# Patient Record
Sex: MALE | Race: BLACK OR AFRICAN AMERICAN | NOT HISPANIC OR LATINO
[De-identification: names, ages, dates, MRNs, and addresses within clinical notes are randomized per-mention and may not be internally consistent; named-entity substitution may affect disease eponyms.]

---

## 2017-11-15 ENCOUNTER — APPOINTMENT (OUTPATIENT)
Dept: UROLOGY | Facility: CLINIC | Age: 47
End: 2017-11-15
Payer: COMMERCIAL

## 2017-11-15 VITALS
RESPIRATION RATE: 16 BRPM | BODY MASS INDEX: 41.2 KG/M2 | HEIGHT: 68.5 IN | OXYGEN SATURATION: 96 % | WEIGHT: 275 LBS | DIASTOLIC BLOOD PRESSURE: 76 MMHG | TEMPERATURE: 97.9 F | SYSTOLIC BLOOD PRESSURE: 118 MMHG | HEART RATE: 88 BPM

## 2017-11-15 DIAGNOSIS — N52.9 MALE ERECTILE DYSFUNCTION, UNSPECIFIED: ICD-10-CM

## 2017-11-15 PROCEDURE — 99203 OFFICE O/P NEW LOW 30 MIN: CPT

## 2017-11-21 PROBLEM — N52.9 ERECTILE DYSFUNCTION OF ORGANIC ORIGIN: Status: ACTIVE | Noted: 2017-11-21

## 2017-11-21 RX ORDER — METFORMIN HYDROCHLORIDE 1000 MG/1
1000 TABLET, COATED ORAL
Qty: 180 | Refills: 0 | Status: ACTIVE | COMMUNITY
Start: 2017-10-16

## 2017-11-21 RX ORDER — DAPAGLIFLOZIN 5 MG/1
5 TABLET, FILM COATED ORAL
Qty: 30 | Refills: 0 | Status: ACTIVE | COMMUNITY
Start: 2017-01-25

## 2017-11-21 RX ORDER — IBUPROFEN 400 MG/1
400 TABLET, FILM COATED ORAL
Qty: 30 | Refills: 0 | Status: ACTIVE | COMMUNITY
Start: 2017-05-05

## 2019-09-03 ENCOUNTER — APPOINTMENT (OUTPATIENT)
Dept: ORTHOPEDIC SURGERY | Facility: CLINIC | Age: 49
End: 2019-09-03
Payer: COMMERCIAL

## 2019-09-03 DIAGNOSIS — Z01.818 ENCOUNTER FOR OTHER PREPROCEDURAL EXAMINATION: ICD-10-CM

## 2019-09-03 PROCEDURE — 99204 OFFICE O/P NEW MOD 45 MIN: CPT

## 2019-09-04 PROBLEM — Z01.818 PREOP TESTING: Status: ACTIVE | Noted: 2019-09-04

## 2019-09-05 NOTE — END OF VISIT
[FreeTextEntry3] : All medical record entries made by Rigoberto Ovalles acting as a scribe for the performing provider (Ji Jorge MD and/or MARCOS Russell) on 09/03/2019. All entries were dictated to me by the performing medical provider. In signing this record, the medical provider affirms that they have personally performed the history, physical exam, assessment and plan and have also directed, reviewed and agreed to the documentation in the chart. [>50% of Time Spent on Counseling for ____] : Greater than 50% of the encounter time was spent on counseling for [unfilled] [Time Spent: ___ minutes] : I have spent [unfilled] minutes of face to face time with the patient

## 2019-09-05 NOTE — HISTORY OF PRESENT ILLNESS
[de-identified] : 48 year old M former smoker (quit 1 year ago) with DM presents today for initial evaluation of left hip pain that began about 10 years ago but has gotten progressively worse. He reports marked, daily left hip pain localized to the groin that occurs upon rising in the morning, with activity and after prolonged sitting. He reports slight, occasional right hip pain localized to the groin. He has difficulty placing shoes on either foot and reports that he actually needs assistance to place shoes on the left foot. Patient can walk greater than 10 blocks with a slight limp. He uses a rail to ascend and descend stairs. OTC analgesic medications help a little to relieve pain whereas physical therapy and bracing do not help.\par Of note, patient works as a  full time. Patient has DM and reports that his last hemoglobin A1C was in the 6-7 range. Mr. Godoy quit smoking about a year ago but reports that he occasionally uses a nicotine vaporizer.

## 2019-09-05 NOTE — DISCUSSION/SUMMARY
[de-identified] : Mr. PERKINS  has progressively severe hip pain and disability secondary to DJD and has failed extensive conservative care including activity modification, analgesic medications and therapeutic exercise. The patient was indicated for left total hip replacement.\par \par We discussed the details of the procedure, the expected recovery period, and the expected outcome. Bearing surface and fixation options were discussed, along with surgical approaches. For this patient I recommended a posterior vs. superior approach to ensure adequate surgical exposure.\par \par We discussed the likelihood of satisfaction after complete recovery, and the potential causes of dissatisfaction. Specific risks of total hip replacement were discussed in detail. We discussed the risk of surgical site complications including but not limited to: surgical site infection, wound healing complications, bone fracture, prosthetic hip dislocation, neurovascular injury, hemorrhage, limb length inequality, persistent pain and need for reoperation. We discussed surgical blood loss and the possible need for blood transfusion. We discussed the risk of perioperative medical complications, including but not limited to catheter-associated urinary tract infection, venous thromboembolism and other cardiopulmonary complications. We discussed anesthetic options and the risk of anesthesia-related complications. We discussed the durability of prosthetic hips and limitations related to wear, osteolysis and loosening. The patient was given a copy of my preoperative packet with additional information about the procedure.\par \par We discussed how nicotine use can affect wound healing and I strongly encouraged the patient to avoid all nicotine use prior to and immediately after the operation.\par \par The patient gave informed consent for the surgical procedure and was instructed to speak to my surgical coordinator to arrange the logistics of surgical scheduling, presurgical testing, and medical optimization and clearance.

## 2019-09-05 NOTE — PHYSICAL EXAM
[de-identified] : Constitutional: Overweight but well appearing. No acute distress.\par Mental Status: Alert & oriented to person, place and time. Normal affect.\par Pulmonary: No respiratory distress. Normal chest excursion.\par \par Gait: Left coxalgic.\par Ambulatory assist devices: None.\par \par Cervical spine: Skin intact. No visible deformity. Painless active ROM without evident restriction.\par Bilateral upper extremities: Skin intact. No deformity. Painless active ROM without evident restriction.\par Thoracolumbar spine: No deformity. No tenderness. No radicular pain on passive straight leg raise bilaterally.\par \par Pelvis: No pelvic obliquity. No tenderness.\par Leg lengths: Equal.\par \par Right Hip:\par Skin intact. No surgical scars. No erythema. No ecchymosis. No swelling. No deformity. No focal tenderness.\par ROM with mild discomfort from full extension to 95 degrees of flexion. 5 degrees of internal rotation. 45-50 degrees of external rotation. 50 degrees of abduction. 10 degrees of adduction.\par No crepitation. No instability. DE painless. Impingement (FADIR) painless. Stinchfield painless. Flexor power 5/5.\par \par Left Hip:\par Skin intact. No surgical scars. No erythema. No ecchymosis. No swelling. No deformity. No focal tenderness.\par Painful ROM from full extension to 70 degrees of flexion. 25 degrees of obligate external rotation. 35 degrees of external rotation. 10-15 degrees of abduction. 0 degrees of adduction.\par No crepitation. No instability.\par DE painful. Impingement (FADIR) painful. Stinchfield painful. \par Flexor power 4-/5, limited by pain.\par \par Bilateral Knees: Skin intact. No surgical scars. No erythema or ecchymosis. No swelling or effusion. No deformity. Painless and unrestricted range of motion. Central patellar tracking. No crepitation. No instability. \par \par Neurological: Intact distal crude touch sensation. Normal distal motor power.\par Cardiovascular: Palpable dorsalis pedis and posterior tibialis pulses. Brisk capillary refill. No peripheral edema.\par Lymphatics: No peripheral adenopathy appreciated. [de-identified] : X-ray imaging of the AP pelvis and left hip done here today demonstrates left hip with severe osteoarthritis with bone-on-bone joint space narrowing, subchondral sclerosis, osteophytes and subchondral cysts

## 2019-09-08 ENCOUNTER — FORM ENCOUNTER (OUTPATIENT)
Age: 49
End: 2019-09-08

## 2019-09-09 ENCOUNTER — APPOINTMENT (OUTPATIENT)
Dept: CT IMAGING | Facility: HOSPITAL | Age: 49
End: 2019-09-09

## 2019-09-09 ENCOUNTER — OUTPATIENT (OUTPATIENT)
Dept: OUTPATIENT SERVICES | Facility: HOSPITAL | Age: 49
LOS: 1 days | End: 2019-09-09
Payer: COMMERCIAL

## 2019-09-09 DIAGNOSIS — Z01.818 ENCOUNTER FOR OTHER PREPROCEDURAL EXAMINATION: ICD-10-CM

## 2019-09-09 DIAGNOSIS — Z22.321 CARRIER OR SUSPECTED CARRIER OF METHICILLIN SUSCEPTIBLE STAPHYLOCOCCUS AUREUS: ICD-10-CM

## 2019-09-09 LAB
ANION GAP SERPL CALC-SCNC: 10 MMOL/L — SIGNIFICANT CHANGE UP (ref 5–17)
APPEARANCE UR: CLEAR — SIGNIFICANT CHANGE UP
APTT BLD: 29.8 SEC — SIGNIFICANT CHANGE UP (ref 27.5–36.3)
BASOPHILS # BLD AUTO: 0.08 K/UL — SIGNIFICANT CHANGE UP (ref 0–0.2)
BASOPHILS NFR BLD AUTO: 1.2 % — SIGNIFICANT CHANGE UP (ref 0–2)
BILIRUB UR-MCNC: NEGATIVE — SIGNIFICANT CHANGE UP
BUN SERPL-MCNC: 12 MG/DL — SIGNIFICANT CHANGE UP (ref 7–23)
CALCIUM SERPL-MCNC: 9.6 MG/DL — SIGNIFICANT CHANGE UP (ref 8.4–10.5)
CHLORIDE SERPL-SCNC: 106 MMOL/L — SIGNIFICANT CHANGE UP (ref 96–108)
CO2 SERPL-SCNC: 25 MMOL/L — SIGNIFICANT CHANGE UP (ref 22–31)
COLOR SPEC: YELLOW — SIGNIFICANT CHANGE UP
CREAT SERPL-MCNC: 0.83 MG/DL — SIGNIFICANT CHANGE UP (ref 0.5–1.3)
DIFF PNL FLD: NEGATIVE — SIGNIFICANT CHANGE UP
EOSINOPHIL # BLD AUTO: 0.27 K/UL — SIGNIFICANT CHANGE UP (ref 0–0.5)
EOSINOPHIL NFR BLD AUTO: 3.9 % — SIGNIFICANT CHANGE UP (ref 0–6)
GLUCOSE SERPL-MCNC: 141 MG/DL — HIGH (ref 70–99)
GLUCOSE UR QL: 100
HBA1C BLD-MCNC: 8.8 % — HIGH (ref 4–5.6)
HCT VFR BLD CALC: 43.1 % — SIGNIFICANT CHANGE UP (ref 39–50)
HGB BLD-MCNC: 14.1 G/DL — SIGNIFICANT CHANGE UP (ref 13–17)
IMM GRANULOCYTES NFR BLD AUTO: 0.4 % — SIGNIFICANT CHANGE UP (ref 0–1.5)
INR BLD: 0.98 — SIGNIFICANT CHANGE UP (ref 0.88–1.16)
KETONES UR-MCNC: NEGATIVE — SIGNIFICANT CHANGE UP
LEUKOCYTE ESTERASE UR-ACNC: NEGATIVE — SIGNIFICANT CHANGE UP
LYMPHOCYTES # BLD AUTO: 2.54 K/UL — SIGNIFICANT CHANGE UP (ref 1–3.3)
LYMPHOCYTES # BLD AUTO: 36.8 % — SIGNIFICANT CHANGE UP (ref 13–44)
MCHC RBC-ENTMCNC: 30.1 PG — SIGNIFICANT CHANGE UP (ref 27–34)
MCHC RBC-ENTMCNC: 32.7 GM/DL — SIGNIFICANT CHANGE UP (ref 32–36)
MCV RBC AUTO: 92.1 FL — SIGNIFICANT CHANGE UP (ref 80–100)
MONOCYTES # BLD AUTO: 0.72 K/UL — SIGNIFICANT CHANGE UP (ref 0–0.9)
MONOCYTES NFR BLD AUTO: 10.4 % — SIGNIFICANT CHANGE UP (ref 2–14)
NEUTROPHILS # BLD AUTO: 3.26 K/UL — SIGNIFICANT CHANGE UP (ref 1.8–7.4)
NEUTROPHILS NFR BLD AUTO: 47.3 % — SIGNIFICANT CHANGE UP (ref 43–77)
NITRITE UR-MCNC: NEGATIVE — SIGNIFICANT CHANGE UP
NRBC # BLD: 0 /100 WBCS — SIGNIFICANT CHANGE UP (ref 0–0)
PH UR: 6.5 — SIGNIFICANT CHANGE UP (ref 5–8)
PLATELET # BLD AUTO: 210 K/UL — SIGNIFICANT CHANGE UP (ref 150–400)
POTASSIUM SERPL-MCNC: 4.4 MMOL/L — SIGNIFICANT CHANGE UP (ref 3.5–5.3)
POTASSIUM SERPL-SCNC: 4.4 MMOL/L — SIGNIFICANT CHANGE UP (ref 3.5–5.3)
PROT UR-MCNC: NEGATIVE MG/DL — SIGNIFICANT CHANGE UP
PROTHROM AB SERPL-ACNC: 11.1 SEC — SIGNIFICANT CHANGE UP (ref 10–12.9)
RBC # BLD: 4.68 M/UL — SIGNIFICANT CHANGE UP (ref 4.2–5.8)
RBC # FLD: 13.5 % — SIGNIFICANT CHANGE UP (ref 10.3–14.5)
SODIUM SERPL-SCNC: 141 MMOL/L — SIGNIFICANT CHANGE UP (ref 135–145)
SP GR SPEC: 1.02 — SIGNIFICANT CHANGE UP (ref 1–1.03)
UROBILINOGEN FLD QL: 0.2 E.U./DL — SIGNIFICANT CHANGE UP
WBC # BLD: 6.9 K/UL — SIGNIFICANT CHANGE UP (ref 3.8–10.5)
WBC # FLD AUTO: 6.9 K/UL — SIGNIFICANT CHANGE UP (ref 3.8–10.5)

## 2019-09-09 PROCEDURE — 93010 ELECTROCARDIOGRAM REPORT: CPT

## 2019-09-09 PROCEDURE — 93005 ELECTROCARDIOGRAM TRACING: CPT

## 2019-09-09 PROCEDURE — 85610 PROTHROMBIN TIME: CPT

## 2019-09-09 PROCEDURE — 80048 BASIC METABOLIC PNL TOTAL CA: CPT

## 2019-09-09 PROCEDURE — 81003 URINALYSIS AUTO W/O SCOPE: CPT

## 2019-09-09 PROCEDURE — 71046 X-RAY EXAM CHEST 2 VIEWS: CPT | Mod: 26

## 2019-09-09 PROCEDURE — 85730 THROMBOPLASTIN TIME PARTIAL: CPT

## 2019-09-09 PROCEDURE — 85025 COMPLETE CBC W/AUTO DIFF WBC: CPT

## 2019-09-09 PROCEDURE — 87086 URINE CULTURE/COLONY COUNT: CPT

## 2019-09-09 PROCEDURE — 83036 HEMOGLOBIN GLYCOSYLATED A1C: CPT

## 2019-09-09 PROCEDURE — 71046 X-RAY EXAM CHEST 2 VIEWS: CPT

## 2019-09-09 PROCEDURE — 87641 MR-STAPH DNA AMP PROBE: CPT

## 2019-09-10 LAB
CULTURE RESULTS: NO GROWTH — SIGNIFICANT CHANGE UP
MRSA PCR RESULT.: NEGATIVE — SIGNIFICANT CHANGE UP
S AUREUS DNA NOSE QL NAA+PROBE: NEGATIVE — SIGNIFICANT CHANGE UP
SPECIMEN SOURCE: SIGNIFICANT CHANGE UP

## 2019-09-12 LAB
ANABASINE UR-MCNC: SIGNIFICANT CHANGE UP NG/ML
COTININE UR-MCNC: >1200 NG/ML — HIGH
NICOTINE UR-MCNC: 90 NG/ML — HIGH
NORNICOTINE UR-MCNC: 26 NG/ML — HIGH

## 2019-09-20 ENCOUNTER — FORM ENCOUNTER (OUTPATIENT)
Age: 49
End: 2019-09-20

## 2019-09-21 ENCOUNTER — OUTPATIENT (OUTPATIENT)
Dept: OUTPATIENT SERVICES | Facility: HOSPITAL | Age: 49
LOS: 1 days | End: 2019-09-21
Payer: COMMERCIAL

## 2019-09-21 ENCOUNTER — APPOINTMENT (OUTPATIENT)
Dept: CT IMAGING | Facility: HOSPITAL | Age: 49
End: 2019-09-21
Payer: COMMERCIAL

## 2019-09-21 PROCEDURE — 73700 CT LOWER EXTREMITY W/O DYE: CPT

## 2019-09-21 PROCEDURE — 73700 CT LOWER EXTREMITY W/O DYE: CPT | Mod: 26,LT

## 2019-09-24 ENCOUNTER — APPOINTMENT (OUTPATIENT)
Dept: CT IMAGING | Facility: HOSPITAL | Age: 49
End: 2019-09-24

## 2019-09-30 ENCOUNTER — APPOINTMENT (OUTPATIENT)
Dept: ORTHOPEDIC SURGERY | Facility: HOSPITAL | Age: 49
End: 2019-09-30

## 2019-12-29 ENCOUNTER — FORM ENCOUNTER (OUTPATIENT)
Age: 49
End: 2019-12-29

## 2020-01-07 ENCOUNTER — FORM ENCOUNTER (OUTPATIENT)
Age: 50
End: 2020-01-07

## 2020-01-08 ENCOUNTER — APPOINTMENT (OUTPATIENT)
Dept: ORTHOPEDIC SURGERY | Facility: CLINIC | Age: 50
End: 2020-01-08
Payer: COMMERCIAL

## 2020-01-08 ENCOUNTER — OUTPATIENT (OUTPATIENT)
Dept: OUTPATIENT SERVICES | Facility: HOSPITAL | Age: 50
LOS: 1 days | End: 2020-01-08
Payer: COMMERCIAL

## 2020-01-08 DIAGNOSIS — M16.12 UNILATERAL PRIMARY OSTEOARTHRITIS, LEFT HIP: ICD-10-CM

## 2020-01-08 PROCEDURE — 99213 OFFICE O/P EST LOW 20 MIN: CPT

## 2020-01-08 PROCEDURE — 73521 X-RAY EXAM HIPS BI 2 VIEWS: CPT

## 2020-01-08 PROCEDURE — 73521 X-RAY EXAM HIPS BI 2 VIEWS: CPT | Mod: 26

## 2020-01-08 NOTE — REVIEW OF SYSTEMS
PT tx     07/10/19 1445   Pain Assessment   Pain Assessment 0-10   Pain Score 6   Pain Type   (headache)   Pain Location Hip   Pain Orientation Right  (thigh tightness)   Restrictions/Precautions   Weight Bearing Precautions Per Order Yes   RLE Weight Bearing Per Order WBAT   General   Chart Reviewed Yes   Cognition   Overall Cognitive Status WFL   Subjective   Subjective Just back to bed, agrees to ther ex   Exercises   THR Supine;10 reps;Right;AAROM   Assessment   Prognosis Good   Problem List Decreased strength;Decreased endurance; Impaired balance;Decreased mobility; Decreased coordination;Decreased safety awareness;Decreased skin integrity;Orthopedic restrictions   Assessment Pt able to perform ther ex with assist  SCDs reapplied afterward  Desires to remain in bed until headache subsides  Tent for transfer to rehab tomorrow  Discussed goals and expectations  Needs in reach   Goals   Patient Goals get better   Plan   Treatment/Interventions ADL retraining;Functional transfer training;LE strengthening/ROM; Therapeutic exercise; Endurance training;Patient/family training;Equipment eval/education; Bed mobility;Gait training;Spoke to nursing;Spoke to case management;OT   Progress Improving as expected   PT Frequency 5x/wk; Twice a day   Recommendation   Recommendation Short-term skilled PT   Equipment Recommended [facility] [Joint Pain] : joint pain [Negative] : Heme/Lymph

## 2020-01-10 ENCOUNTER — RX RENEWAL (OUTPATIENT)
Age: 50
End: 2020-01-10

## 2020-01-10 RX ORDER — MELOXICAM 7.5 MG/1
7.5 TABLET ORAL DAILY
Qty: 60 | Refills: 0 | Status: ACTIVE | COMMUNITY
Start: 2020-01-10 | End: 1900-01-01

## 2020-01-21 ENCOUNTER — OUTPATIENT (OUTPATIENT)
Dept: OUTPATIENT SERVICES | Facility: HOSPITAL | Age: 50
LOS: 1 days | End: 2020-01-21
Payer: COMMERCIAL

## 2020-01-21 DIAGNOSIS — Z22.321 CARRIER OR SUSPECTED CARRIER OF METHICILLIN SUSCEPTIBLE STAPHYLOCOCCUS AUREUS: ICD-10-CM

## 2020-01-21 LAB
MRSA PCR RESULT.: NEGATIVE — SIGNIFICANT CHANGE UP
S AUREUS DNA NOSE QL NAA+PROBE: NEGATIVE — SIGNIFICANT CHANGE UP

## 2020-01-21 PROCEDURE — 87641 MR-STAPH DNA AMP PROBE: CPT

## 2020-01-27 NOTE — DISCUSSION/SUMMARY
[de-identified] : - we discussed non operative and operative treatment options and the patient would like to proceed with L LAKESHA\par - continue NSAIDs and RICE for pain relief\par - will need pre operative clearance\par - follow up day of surgery\par \par MARANDA will benefit from the proposed procedure, he has failed a conservative treatment plan of supervised physical therapy, anti-inflammatory medications, and activity modification. He continues to have pain impacting his ability to perform ADL's and has a decreasing QoL.  We will schedule this for the earliest mutually convenient time after the appropriate pre-op laboratory tests and clearances are obtained.  All questions were answered and a thorough explanation of RBA were given.\par \par All medical record entries made by the PA/Scribe/Fellow are at my, Dr. Raymon Monae's direction and personally dictated by me on 01/08/2020]. I have reviewed the chart and agree that the record accurately reflects my personal performance of the history, physical exam, assessment, and plan. I have also personally directed reviewed, and agreed with the chart.\par

## 2020-01-27 NOTE — PHYSICAL EXAM
[Normal] : Normal bowel sounds, soft, non-tender, no hepato-splenomegaly and no abdominal mass palpated [de-identified] : NAD [de-identified] : LLE: No deformity. Non ttp. ROM: flex 60, ER 15, IR -5, abd 30. Strength 5/5. NVI distally. [de-identified] : XR of the left hip shows severe OA changes.

## 2020-02-13 ENCOUNTER — APPOINTMENT (OUTPATIENT)
Dept: ORTHOPEDIC SURGERY | Facility: HOSPITAL | Age: 50
End: 2020-02-13
Payer: COMMERCIAL

## 2020-02-13 ENCOUNTER — INPATIENT (INPATIENT)
Facility: HOSPITAL | Age: 50
LOS: 1 days | Discharge: HOME HEALTH | DRG: 470 | End: 2020-02-15
Attending: ORTHOPAEDIC SURGERY | Admitting: ORTHOPAEDIC SURGERY
Payer: COMMERCIAL

## 2020-02-13 VITALS
RESPIRATION RATE: 16 BRPM | OXYGEN SATURATION: 98 % | DIASTOLIC BLOOD PRESSURE: 73 MMHG | TEMPERATURE: 98 F | HEART RATE: 61 BPM | WEIGHT: 273.59 LBS | SYSTOLIC BLOOD PRESSURE: 123 MMHG | HEIGHT: 69 IN

## 2020-02-13 LAB
BASOPHILS # BLD AUTO: 0.07 K/UL — SIGNIFICANT CHANGE UP (ref 0–0.2)
BASOPHILS NFR BLD AUTO: 0.6 % — SIGNIFICANT CHANGE UP (ref 0–2)
EOSINOPHIL # BLD AUTO: 0.19 K/UL — SIGNIFICANT CHANGE UP (ref 0–0.5)
EOSINOPHIL NFR BLD AUTO: 1.6 % — SIGNIFICANT CHANGE UP (ref 0–6)
GLUCOSE BLDC GLUCOMTR-MCNC: 108 MG/DL — HIGH (ref 70–99)
GLUCOSE BLDC GLUCOMTR-MCNC: 167 MG/DL — HIGH (ref 70–99)
GLUCOSE BLDC GLUCOMTR-MCNC: 87 MG/DL — SIGNIFICANT CHANGE UP (ref 70–99)
GLUCOSE BLDC GLUCOMTR-MCNC: 88 MG/DL — SIGNIFICANT CHANGE UP (ref 70–99)
HCT VFR BLD CALC: 43 % — SIGNIFICANT CHANGE UP (ref 39–50)
HGB BLD-MCNC: 14 G/DL — SIGNIFICANT CHANGE UP (ref 13–17)
IMM GRANULOCYTES NFR BLD AUTO: 0.9 % — SIGNIFICANT CHANGE UP (ref 0–1.5)
LYMPHOCYTES # BLD AUTO: 1.76 K/UL — SIGNIFICANT CHANGE UP (ref 1–3.3)
LYMPHOCYTES # BLD AUTO: 14.9 % — SIGNIFICANT CHANGE UP (ref 13–44)
MCHC RBC-ENTMCNC: 30.2 PG — SIGNIFICANT CHANGE UP (ref 27–34)
MCHC RBC-ENTMCNC: 32.6 GM/DL — SIGNIFICANT CHANGE UP (ref 32–36)
MCV RBC AUTO: 92.9 FL — SIGNIFICANT CHANGE UP (ref 80–100)
MONOCYTES # BLD AUTO: 0.49 K/UL — SIGNIFICANT CHANGE UP (ref 0–0.9)
MONOCYTES NFR BLD AUTO: 4.1 % — SIGNIFICANT CHANGE UP (ref 2–14)
NEUTROPHILS # BLD AUTO: 9.23 K/UL — HIGH (ref 1.8–7.4)
NEUTROPHILS NFR BLD AUTO: 77.9 % — HIGH (ref 43–77)
NRBC # BLD: 0 /100 WBCS — SIGNIFICANT CHANGE UP (ref 0–0)
PLATELET # BLD AUTO: 206 K/UL — SIGNIFICANT CHANGE UP (ref 150–400)
RBC # BLD: 4.63 M/UL — SIGNIFICANT CHANGE UP (ref 4.2–5.8)
RBC # FLD: 13.7 % — SIGNIFICANT CHANGE UP (ref 10.3–14.5)
WBC # BLD: 11.85 K/UL — HIGH (ref 3.8–10.5)
WBC # FLD AUTO: 11.85 K/UL — HIGH (ref 3.8–10.5)

## 2020-02-13 PROCEDURE — 27130 TOTAL HIP ARTHROPLASTY: CPT | Mod: LT

## 2020-02-13 PROCEDURE — 27130 TOTAL HIP ARTHROPLASTY: CPT | Mod: AS,LT

## 2020-02-13 PROCEDURE — 73501 X-RAY EXAM HIP UNI 1 VIEW: CPT | Mod: 26,LT

## 2020-02-13 RX ORDER — CEFAZOLIN SODIUM 1 G
2000 VIAL (EA) INJECTION EVERY 8 HOURS
Refills: 0 | Status: DISCONTINUED | OUTPATIENT
Start: 2020-02-13 | End: 2020-02-13

## 2020-02-13 RX ORDER — CELECOXIB 200 MG/1
200 CAPSULE ORAL
Refills: 0 | Status: DISCONTINUED | OUTPATIENT
Start: 2020-02-13 | End: 2020-02-15

## 2020-02-13 RX ORDER — EMPAGLIFLOZIN 10 MG/1
1 TABLET, FILM COATED ORAL
Qty: 0 | Refills: 0 | DISCHARGE

## 2020-02-13 RX ORDER — PANTOPRAZOLE SODIUM 20 MG/1
40 TABLET, DELAYED RELEASE ORAL
Refills: 0 | Status: DISCONTINUED | OUTPATIENT
Start: 2020-02-13 | End: 2020-02-15

## 2020-02-13 RX ORDER — ASPIRIN/CALCIUM CARB/MAGNESIUM 324 MG
81 TABLET ORAL
Refills: 0 | Status: DISCONTINUED | OUTPATIENT
Start: 2020-02-13 | End: 2020-02-15

## 2020-02-13 RX ORDER — OXYCODONE HYDROCHLORIDE 5 MG/1
10 TABLET ORAL EVERY 4 HOURS
Refills: 0 | Status: DISCONTINUED | OUTPATIENT
Start: 2020-02-13 | End: 2020-02-15

## 2020-02-13 RX ORDER — ACETAMINOPHEN 500 MG
650 TABLET ORAL EVERY 6 HOURS
Refills: 0 | Status: DISCONTINUED | OUTPATIENT
Start: 2020-02-13 | End: 2020-02-15

## 2020-02-13 RX ORDER — METFORMIN HYDROCHLORIDE 850 MG/1
1000 TABLET ORAL
Refills: 0 | Status: DISCONTINUED | OUTPATIENT
Start: 2020-02-13 | End: 2020-02-13

## 2020-02-13 RX ORDER — METFORMIN HYDROCHLORIDE 850 MG/1
1 TABLET ORAL
Qty: 0 | Refills: 0 | DISCHARGE

## 2020-02-13 RX ORDER — POLYETHYLENE GLYCOL 3350 17 G/17G
17 POWDER, FOR SOLUTION ORAL DAILY
Refills: 0 | Status: DISCONTINUED | OUTPATIENT
Start: 2020-02-13 | End: 2020-02-15

## 2020-02-13 RX ORDER — ATORVASTATIN CALCIUM 80 MG/1
10 TABLET, FILM COATED ORAL AT BEDTIME
Refills: 0 | Status: DISCONTINUED | OUTPATIENT
Start: 2020-02-13 | End: 2020-02-15

## 2020-02-13 RX ORDER — CEFAZOLIN SODIUM 1 G
3000 VIAL (EA) INJECTION EVERY 8 HOURS
Refills: 0 | Status: COMPLETED | OUTPATIENT
Start: 2020-02-13 | End: 2020-02-14

## 2020-02-13 RX ORDER — DEXTROSE 50 % IN WATER 50 %
25 SYRINGE (ML) INTRAVENOUS ONCE
Refills: 0 | Status: DISCONTINUED | OUTPATIENT
Start: 2020-02-13 | End: 2020-02-15

## 2020-02-13 RX ORDER — OXYCODONE HYDROCHLORIDE 5 MG/1
5 TABLET ORAL EVERY 4 HOURS
Refills: 0 | Status: DISCONTINUED | OUTPATIENT
Start: 2020-02-13 | End: 2020-02-15

## 2020-02-13 RX ORDER — INSULIN LISPRO 100/ML
VIAL (ML) SUBCUTANEOUS
Refills: 0 | Status: DISCONTINUED | OUTPATIENT
Start: 2020-02-13 | End: 2020-02-15

## 2020-02-13 RX ORDER — SODIUM CHLORIDE 9 MG/ML
1000 INJECTION, SOLUTION INTRAVENOUS
Refills: 0 | Status: DISCONTINUED | OUTPATIENT
Start: 2020-02-13 | End: 2020-02-15

## 2020-02-13 RX ORDER — GLUCAGON INJECTION, SOLUTION 0.5 MG/.1ML
1 INJECTION, SOLUTION SUBCUTANEOUS ONCE
Refills: 0 | Status: DISCONTINUED | OUTPATIENT
Start: 2020-02-13 | End: 2020-02-15

## 2020-02-13 RX ORDER — DEXTROSE 50 % IN WATER 50 %
15 SYRINGE (ML) INTRAVENOUS ONCE
Refills: 0 | Status: DISCONTINUED | OUTPATIENT
Start: 2020-02-13 | End: 2020-02-15

## 2020-02-13 RX ORDER — HYDROMORPHONE HYDROCHLORIDE 2 MG/ML
0.5 INJECTION INTRAMUSCULAR; INTRAVENOUS; SUBCUTANEOUS
Refills: 0 | Status: DISCONTINUED | OUTPATIENT
Start: 2020-02-13 | End: 2020-02-15

## 2020-02-13 RX ORDER — KETOROLAC TROMETHAMINE 30 MG/ML
15 SYRINGE (ML) INJECTION ONCE
Refills: 0 | Status: DISCONTINUED | OUTPATIENT
Start: 2020-02-13 | End: 2020-02-13

## 2020-02-13 RX ORDER — DEXTROSE 50 % IN WATER 50 %
12.5 SYRINGE (ML) INTRAVENOUS ONCE
Refills: 0 | Status: DISCONTINUED | OUTPATIENT
Start: 2020-02-13 | End: 2020-02-15

## 2020-02-13 RX ORDER — MAGNESIUM HYDROXIDE 400 MG/1
30 TABLET, CHEWABLE ORAL DAILY
Refills: 0 | Status: DISCONTINUED | OUTPATIENT
Start: 2020-02-13 | End: 2020-02-15

## 2020-02-13 RX ORDER — ONDANSETRON 8 MG/1
4 TABLET, FILM COATED ORAL EVERY 6 HOURS
Refills: 0 | Status: DISCONTINUED | OUTPATIENT
Start: 2020-02-13 | End: 2020-02-15

## 2020-02-13 RX ORDER — FAMOTIDINE 10 MG/ML
20 INJECTION INTRAVENOUS EVERY 12 HOURS
Refills: 0 | Status: DISCONTINUED | OUTPATIENT
Start: 2020-02-13 | End: 2020-02-15

## 2020-02-13 RX ORDER — SENNA PLUS 8.6 MG/1
2 TABLET ORAL AT BEDTIME
Refills: 0 | Status: DISCONTINUED | OUTPATIENT
Start: 2020-02-13 | End: 2020-02-15

## 2020-02-13 RX ORDER — DIPHENHYDRAMINE HCL 50 MG
25 CAPSULE ORAL AT BEDTIME
Refills: 0 | Status: DISCONTINUED | OUTPATIENT
Start: 2020-02-13 | End: 2020-02-15

## 2020-02-13 RX ADMIN — HYDROMORPHONE HYDROCHLORIDE 0.5 MILLIGRAM(S): 2 INJECTION INTRAMUSCULAR; INTRAVENOUS; SUBCUTANEOUS at 16:43

## 2020-02-13 RX ADMIN — Medication 650 MILLIGRAM(S): at 23:22

## 2020-02-13 RX ADMIN — OXYCODONE HYDROCHLORIDE 10 MILLIGRAM(S): 5 TABLET ORAL at 23:22

## 2020-02-13 RX ADMIN — Medication 650 MILLIGRAM(S): at 20:35

## 2020-02-13 RX ADMIN — ATORVASTATIN CALCIUM 10 MILLIGRAM(S): 80 TABLET, FILM COATED ORAL at 21:19

## 2020-02-13 RX ADMIN — Medication 650 MILLIGRAM(S): at 19:35

## 2020-02-13 RX ADMIN — Medication 81 MILLIGRAM(S): at 19:36

## 2020-02-13 RX ADMIN — Medication 200 MILLIGRAM(S): at 19:36

## 2020-02-13 RX ADMIN — OXYCODONE HYDROCHLORIDE 5 MILLIGRAM(S): 5 TABLET ORAL at 20:35

## 2020-02-13 RX ADMIN — SODIUM CHLORIDE 120 MILLILITER(S): 9 INJECTION, SOLUTION INTRAVENOUS at 12:52

## 2020-02-13 RX ADMIN — HYDROMORPHONE HYDROCHLORIDE 0.5 MILLIGRAM(S): 2 INJECTION INTRAMUSCULAR; INTRAVENOUS; SUBCUTANEOUS at 16:28

## 2020-02-13 RX ADMIN — OXYCODONE HYDROCHLORIDE 5 MILLIGRAM(S): 5 TABLET ORAL at 19:35

## 2020-02-13 RX ADMIN — Medication 2: at 22:30

## 2020-02-13 RX ADMIN — FAMOTIDINE 20 MILLIGRAM(S): 10 INJECTION INTRAVENOUS at 19:36

## 2020-02-13 RX ADMIN — Medication 15 MILLIGRAM(S): at 16:30

## 2020-02-13 NOTE — PHYSICAL THERAPY INITIAL EVALUATION ADULT - PLANNED THERAPY INTERVENTIONS, PT EVAL
gait training/balance training/bed mobility training/ROM/stair negotiation/strengthening/transfer training

## 2020-02-13 NOTE — H&P ADULT - NSICDXPASTMEDICALHX_GEN_ALL_CORE_FT
PAST MEDICAL HISTORY:  Dyslipidemia     Morbid obesity     Osteoarthritis of left hip     Type 2 diabetes mellitus

## 2020-02-13 NOTE — BRIEF OPERATIVE NOTE - ESTIMATED BLOOD LOSS
I had called him twice early earlier today.  I called the number provided and this led to a voicemail system which is not yet set up.  I called back on the original number and told him to call back with a number that I can reach him at.   100

## 2020-02-13 NOTE — PHYSICAL THERAPY INITIAL EVALUATION ADULT - PERTINENT HX OF CURRENT PROBLEM, REHAB EVAL
48yo male co atraumatic nonradiating L hip pain >10 years.  Pt states pain began slowly and progressed denying any precipitating event.Pt takes ibuprofen for his left hip pain without relief. Pt ambulates indepencently. Pt has attempted and failed conservative treatment for his left hip pain consisting of oral pain medication.  Patient presents for elective L LAKESHA.

## 2020-02-13 NOTE — PHYSICAL THERAPY INITIAL EVALUATION ADULT - ADDITIONAL COMMENTS
Pt reports living at home with his wife and daughter. He has 3 flights of stairs to access his home (21 steps total). He denied any prior use of AD.

## 2020-02-13 NOTE — H&P ADULT - NSHPPHYSICALEXAM_GEN_ALL_CORE
General: Alert and oriented, NAD  MSK:  Decreased ROM of left hip secondary to pain.  EHL/FHL/TA/Gastro 5/5 b/l   DP's palpable b/l le   Gross sensation to light touch intact throughout lower extremities b/l and symmetrically   Calves, soft, compressible, NTTP  Remainder of PE as per medical clearance

## 2020-02-13 NOTE — H&P ADULT - HISTORY OF PRESENT ILLNESS
48yo male co atraumatic nonradiating L hip pain >10 years.  Pt states pain began slowly and progressed denying any precipitating event.  Pt takes ibuprofen for his left hip pain without relief. Pt ambulates indepencently. Pt has attempted and failed conservative treatment for his left hip pain consisting of oral pain medication.  Pt not on any anticoagulation meds and denies hx of DVT. Pt denies numbness and tingling down lower extremities b/l, fever, chills, recent illness, CP, SOB, N/V, or any other complaints.   Patient presents for elective L LAKESHA.

## 2020-02-13 NOTE — H&P ADULT - NSICDXFAMILYHX_GEN_ALL_CORE_FT
FAMILY HISTORY:  Grandparent  Still living? No  Family history of diabetes mellitus in grandmother, Age at diagnosis: Age Unknown  Family history of hypertension in grandmother, Age at diagnosis: Age Unknown

## 2020-02-13 NOTE — PROGRESS NOTE ADULT - SUBJECTIVE AND OBJECTIVE BOX
Ortho Post Op Check    Procedure: L LAKESHA  Surgeon: Dov    Pt comfortable without complaints, pain controlled. Denies CP, SOB, N/V, numbness/tingling     Vital Signs Last 24 Hrs  T(C): 36.6 (02-13-20 @ 20:27), Max: 36.6 (02-13-20 @ 15:38)  T(F): 97.9 (02-13-20 @ 20:27), Max: 97.9 (02-13-20 @ 20:27)  HR: 79 (02-13-20 @ 20:27) (54 - 79)  BP: 126/69 (02-13-20 @ 20:27) (119/68 - 134/61)  BP(mean): 93 (02-13-20 @ 16:38) (89 - 93)  RR: 16 (02-13-20 @ 20:27) (12 - 16)  SpO2: 97% (02-13-20 @ 20:27) (97% - 100%)    Physical Exam:  General: Resting comfortably in bed. AAOx3. NAD.  Extremities:        LLE: Dressing intact, dry SILT L2-S1 distribution, symmetric. TA/EHL/FHL/GS motor intact. WWP, DP 2+       RLE: No gross deformity. SILT L2-S1 distribution, symmetric. TA/EHL/FHL/GS motor intact. WWP, DP 2+                            14.0   11.85 )---------( 206      ( 13 Feb 2020 13:23 )             43.0           Post-op X-Ray: Well-fixed L hip prosthesis    A/P: 49yMale POD#0 s/p L LAKESHA  - Stable  - Pain Control  - DVT ppx: ASA  - Post op abx: Ancef  - PT, WBS: PWB    Ortho Pager [phone]

## 2020-02-14 ENCOUNTER — TRANSCRIPTION ENCOUNTER (OUTPATIENT)
Age: 50
End: 2020-02-14

## 2020-02-14 LAB
ANION GAP SERPL CALC-SCNC: 8 MMOL/L — SIGNIFICANT CHANGE UP (ref 5–17)
BUN SERPL-MCNC: 14 MG/DL — SIGNIFICANT CHANGE UP (ref 7–23)
CALCIUM SERPL-MCNC: 8.2 MG/DL — LOW (ref 8.4–10.5)
CHLORIDE SERPL-SCNC: 104 MMOL/L — SIGNIFICANT CHANGE UP (ref 96–108)
CO2 SERPL-SCNC: 21 MMOL/L — LOW (ref 22–31)
CREAT SERPL-MCNC: 0.74 MG/DL — SIGNIFICANT CHANGE UP (ref 0.5–1.3)
GLUCOSE BLDC GLUCOMTR-MCNC: 150 MG/DL — HIGH (ref 70–99)
GLUCOSE BLDC GLUCOMTR-MCNC: 171 MG/DL — HIGH (ref 70–99)
GLUCOSE BLDC GLUCOMTR-MCNC: 210 MG/DL — HIGH (ref 70–99)
GLUCOSE SERPL-MCNC: 192 MG/DL — HIGH (ref 70–99)
HBA1C BLD-MCNC: 6.7 % — HIGH (ref 4–5.6)
HCT VFR BLD CALC: 40.2 % — SIGNIFICANT CHANGE UP (ref 39–50)
HGB BLD-MCNC: 13.4 G/DL — SIGNIFICANT CHANGE UP (ref 13–17)
MCHC RBC-ENTMCNC: 30.2 PG — SIGNIFICANT CHANGE UP (ref 27–34)
MCHC RBC-ENTMCNC: 33.3 GM/DL — SIGNIFICANT CHANGE UP (ref 32–36)
MCV RBC AUTO: 90.5 FL — SIGNIFICANT CHANGE UP (ref 80–100)
NRBC # BLD: 0 /100 WBCS — SIGNIFICANT CHANGE UP (ref 0–0)
PLATELET # BLD AUTO: 203 K/UL — SIGNIFICANT CHANGE UP (ref 150–400)
POTASSIUM SERPL-MCNC: 3.7 MMOL/L — SIGNIFICANT CHANGE UP (ref 3.5–5.3)
POTASSIUM SERPL-SCNC: 3.7 MMOL/L — SIGNIFICANT CHANGE UP (ref 3.5–5.3)
RBC # BLD: 4.44 M/UL — SIGNIFICANT CHANGE UP (ref 4.2–5.8)
RBC # FLD: 13.5 % — SIGNIFICANT CHANGE UP (ref 10.3–14.5)
SODIUM SERPL-SCNC: 133 MMOL/L — LOW (ref 135–145)
WBC # BLD: 14.66 K/UL — HIGH (ref 3.8–10.5)
WBC # FLD AUTO: 14.66 K/UL — HIGH (ref 3.8–10.5)

## 2020-02-14 PROCEDURE — 99222 1ST HOSP IP/OBS MODERATE 55: CPT

## 2020-02-14 RX ADMIN — OXYCODONE HYDROCHLORIDE 10 MILLIGRAM(S): 5 TABLET ORAL at 00:22

## 2020-02-14 RX ADMIN — Medication 650 MILLIGRAM(S): at 23:18

## 2020-02-14 RX ADMIN — OXYCODONE HYDROCHLORIDE 10 MILLIGRAM(S): 5 TABLET ORAL at 18:38

## 2020-02-14 RX ADMIN — OXYCODONE HYDROCHLORIDE 10 MILLIGRAM(S): 5 TABLET ORAL at 13:10

## 2020-02-14 RX ADMIN — FAMOTIDINE 20 MILLIGRAM(S): 10 INJECTION INTRAVENOUS at 05:58

## 2020-02-14 RX ADMIN — FAMOTIDINE 20 MILLIGRAM(S): 10 INJECTION INTRAVENOUS at 17:38

## 2020-02-14 RX ADMIN — OXYCODONE HYDROCHLORIDE 10 MILLIGRAM(S): 5 TABLET ORAL at 21:35

## 2020-02-14 RX ADMIN — Medication 4: at 21:53

## 2020-02-14 RX ADMIN — Medication 81 MILLIGRAM(S): at 05:58

## 2020-02-14 RX ADMIN — Medication 200 MILLIGRAM(S): at 01:46

## 2020-02-14 RX ADMIN — OXYCODONE HYDROCHLORIDE 10 MILLIGRAM(S): 5 TABLET ORAL at 09:31

## 2020-02-14 RX ADMIN — OXYCODONE HYDROCHLORIDE 10 MILLIGRAM(S): 5 TABLET ORAL at 14:10

## 2020-02-14 RX ADMIN — ONDANSETRON 4 MILLIGRAM(S): 8 TABLET, FILM COATED ORAL at 07:16

## 2020-02-14 RX ADMIN — OXYCODONE HYDROCHLORIDE 10 MILLIGRAM(S): 5 TABLET ORAL at 22:35

## 2020-02-14 RX ADMIN — Medication 650 MILLIGRAM(S): at 17:38

## 2020-02-14 RX ADMIN — Medication 650 MILLIGRAM(S): at 14:11

## 2020-02-14 RX ADMIN — Medication 650 MILLIGRAM(S): at 00:22

## 2020-02-14 RX ADMIN — OXYCODONE HYDROCHLORIDE 10 MILLIGRAM(S): 5 TABLET ORAL at 04:39

## 2020-02-14 RX ADMIN — OXYCODONE HYDROCHLORIDE 10 MILLIGRAM(S): 5 TABLET ORAL at 08:31

## 2020-02-14 RX ADMIN — OXYCODONE HYDROCHLORIDE 10 MILLIGRAM(S): 5 TABLET ORAL at 03:39

## 2020-02-14 RX ADMIN — PANTOPRAZOLE SODIUM 40 MILLIGRAM(S): 20 TABLET, DELAYED RELEASE ORAL at 05:58

## 2020-02-14 RX ADMIN — Medication 81 MILLIGRAM(S): at 17:38

## 2020-02-14 RX ADMIN — OXYCODONE HYDROCHLORIDE 10 MILLIGRAM(S): 5 TABLET ORAL at 17:38

## 2020-02-14 RX ADMIN — Medication 650 MILLIGRAM(S): at 05:58

## 2020-02-14 RX ADMIN — Medication 650 MILLIGRAM(S): at 06:38

## 2020-02-14 RX ADMIN — Medication 650 MILLIGRAM(S): at 18:38

## 2020-02-14 RX ADMIN — POLYETHYLENE GLYCOL 3350 17 GRAM(S): 17 POWDER, FOR SOLUTION ORAL at 13:11

## 2020-02-14 RX ADMIN — ATORVASTATIN CALCIUM 10 MILLIGRAM(S): 80 TABLET, FILM COATED ORAL at 21:35

## 2020-02-14 RX ADMIN — Medication 2: at 07:04

## 2020-02-14 RX ADMIN — Medication 650 MILLIGRAM(S): at 13:11

## 2020-02-14 NOTE — PROGRESS NOTE ADULT - SUBJECTIVE AND OBJECTIVE BOX
POST OPERATIVE DAY #: 0   STATUS POST: s/p left total hip replacement             SUBJECTIVE: Patient seen and examined. States to doing well. Mild pain controlled with medication. Patient denies any CP, SOB, fever, chills, numbness/tingling.     Pain:  well controlled      OBJECTIVE:     Vital Signs Last 24 Hrs  T(C): 36.8 (14 Feb 2020 08:29), Max: 36.8 (14 Feb 2020 08:29)  T(F): 98.3 (14 Feb 2020 08:29), Max: 98.3 (14 Feb 2020 08:29)  HR: 59 (14 Feb 2020 08:29) (48 - 79)  BP: 105/61 (14 Feb 2020 08:29) (102/61 - 134/61)  BP(mean): 93 (13 Feb 2020 16:38) (81 - 93)  RR: 17 (14 Feb 2020 08:29) (11 - 17)  SpO2: 97% (14 Feb 2020 08:29) (95% - 100%)    Affected extremity:          Dressing: clean/dry/intact          Sensation: intact to light touch          Motor exam: 5/5 to EHL/TA/GS         warm, well-perfused; capillary refill < 3 seconds              I&O's Detail    13 Feb 2020 07:01  -  14 Feb 2020 07:00  --------------------------------------------------------  IN:    lactated ringers.: 360 mL  Total IN: 360 mL    OUT:    Voided: 1000 mL  Total OUT: 1000 mL    Total NET: -640 mL      14 Feb 2020 07:01  -  14 Feb 2020 11:00  --------------------------------------------------------  IN:    Oral Fluid: 240 mL  Total IN: 240 mL    OUT:  Total OUT: 0 mL    Total NET: 240 mL          LABS:                        13.4   14.66 )-----------( 203      ( 14 Feb 2020 06:54 )             40.2     02-14    133<L>  |  104  |  14  ----------------------------<  192<H>  3.7   |  21<L>  |  0.74    Ca    8.2<L>      14 Feb 2020 06:54            MEDICATIONS:    acetaminophen   Tablet .. 650 milliGRAM(s) Oral every 6 hours  celecoxib 200 milliGRAM(s) Oral two times a day  diphenhydrAMINE 25 milliGRAM(s) Oral at bedtime PRN  HYDROmorphone  Injectable 0.5 milliGRAM(s) IV Push every 15 minutes PRN  ondansetron Injectable 4 milliGRAM(s) IV Push every 6 hours PRN  oxyCODONE    IR 5 milliGRAM(s) Oral every 4 hours PRN  oxyCODONE    IR 10 milliGRAM(s) Oral every 4 hours PRN    aspirin enteric coated 81 milliGRAM(s) Oral two times a day      RADIOLOGY & ADDITIONAL STUDIES:    ASSESSMENT AND PLAN: s/p left total hip replacement    1. Analgesic pain control  2. DVT prophylaxis: ASA         SCDs       3. Continue PT   4. Weight Bearing Status:  Weight bearing as tolerated        5. Disposition: Home with home PT

## 2020-02-14 NOTE — DISCHARGE NOTE PROVIDER - NSDCMRMEDTOKEN_GEN_ALL_CORE_FT
atorvastatin 10 mg oral tablet: 1 tab(s) orally once a day (at bedtime)  Jardiance 10 mg oral tablet: 1 tab(s) orally once a day (in the morning)  metFORMIN 1000 mg oral tablet: 1 tab(s) orally 2 times a day acetaminophen 325 mg oral tablet: 2 tab(s) orally every 6 hours, As Needed for mild pain / fevers  Aspirin Enteric Coated 81 mg oral delayed release tablet: 1 tab(s) orally 2 times a day   atorvastatin 10 mg oral tablet: 1 tab(s) orally once a day (at bedtime)  celecoxib 200 mg oral capsule: 1 cap(s) orally 2 times a day  docusate sodium 100 mg oral tablet: 1 tab(s) orally 2 times a day, As Needed for constipation  Jardiance 10 mg oral tablet: 1 tab(s) orally once a day (in the morning)  metFORMIN 1000 mg oral tablet: 1 tab(s) orally 2 times a day  oxyCODONE 5 mg oral tablet: 1-2 tab(s) orally every 4 hours, As Needed for moderate to severe pain MDD:6   senna oral tablet: 2 tab(s) orally once a day (at bedtime), As needed, Constipation

## 2020-02-14 NOTE — DISCHARGE NOTE PROVIDER - NSDCFUADDINST_GEN_ALL_CORE_FT
Weight bear as tolerated with assistive device.  No strenuous activity, heavy lifting, driving or returning to work until cleared by MD.  You may shower - dressing is water-resistant, no soaking in bathtubs.  Keep dressing intact until your followup appointment with Dr. Monae.  Try to have regular bowel movements, take stool softener or laxative if necessary.  May take Pepcid or Zantac for upset stomach.  May take Aleve or Naproxen instead of Meloxicam/Celebrex.  Swelling may travel all the way down leg to foot, this is normal and will subside in a few weeks.  Call to schedule an appt with Dr. Monae for follow up, if you have staples or sutures they will be removed in office.  Contact your doctor if you experience: fever greater than 101.5, chills, chest pain, difficulty breathing, redness or excessive drainage around the incision, other concerns.  Follow up with your primary care provider. 50% protected weight on operated hip using a rolling walker  Anterior and posterior hip precautions to prevent hip dislocation  No crossing your legs. Do not bend past your waist.  Use long-handled reach to pick things up if purchased.  Sit in a high chair.  If you do not have a high chair, use cushions on the chair  No strenuous activity, heavy lifting, driving or returning to work until cleared by MD.  You may shower - dressing is water-resistant, no soaking in bathtubs.  Keep dressing on your hip until the follow up appointment.  Try to have regular bowel movements, take stool softener or laxative if necessary.  May take Pepcid or Zantac for upset stomach.  Swelling may travel all the way down leg to foot, this is normal and will subside in a few weeks.  Call to schedule an appt with Dr. Monae for follow up.    Contact your doctor if you experience: fever greater than 101.5, chills, chest pain, difficulty breathing, redness or excessive drainage around the incision, other concerns.  Follow up with your primary care provider. Protected weightbearing on operated hip using a rolling walker  Anterior hip precautions to prevent hip dislocation  No crossing your legs. Do not bend past your waist.  Use long-handled reach to pick things up if purchased.  Sit in a high chair.  If you do not have a high chair, use cushions on the chair  No strenuous activity, heavy lifting, driving or returning to work until cleared by MD.  You may shower - dressing is water-resistant, no soaking in bathtubs.  Keep dressing on your hip until the follow up appointment.  Try to have regular bowel movements, take stool softener or laxative if necessary.  May take Pepcid or Zantac for upset stomach.  Swelling may travel all the way down leg to foot, this is normal and will subside in a few weeks.  Call to schedule an appt with Dr. Monae for follow up.    Contact your doctor if you experience: fever greater than 101.5, chills, chest pain, difficulty breathing, redness or excessive drainage around the incision, other concerns.  Follow up with your primary care provider.

## 2020-02-14 NOTE — CONSULT NOTE ADULT - ASSESSMENT
49M with DM (a1c 6.7), HLD, OA here for L LAKESHA 2/13 now POD1    #Post-op state - pain controlled at this time. c/w bowel regimen, c/w IS. Dispo per PT and pending primary team. ppx w ASA 81 BID  #OA - Chronic, post-op  #DM - Chronic, stable. BGs at target. Holding home regimen. c/w SSI  #HLD - Chronic, Stable. c/w home regimen  #Hyponatremia - Acute, likely low PO intake on day of surgery. Appears euvolemic.     Recommendations   - dispo pending PT eval   - can keep on SSI at this time since needs are low; will continue following sugars   - BMP in AM, encourage PO, if lower obtain urine Sodium, Cr, and OSM

## 2020-02-14 NOTE — DISCHARGE NOTE PROVIDER - HOSPITAL COURSE
Admitted    Surgery    Kristy-op Antibiotics    Pain control    DVT prophylaxis    OOB/Physical Therapy Admitted    Surgery - left total hip replacement    Kristy-op Antibiotics    Pain control    DVT prophylaxis    OOB/Physical Therapy

## 2020-02-14 NOTE — DISCHARGE NOTE PROVIDER - NSDCACTIVITY_GEN_ALL_CORE
Do not make important decisions/Do not drive or operate machinery/No heavy lifting/straining Do not make important decisions/No heavy lifting/straining/Walking - Indoors allowed/Do not drive or operate machinery

## 2020-02-14 NOTE — DISCHARGE NOTE PROVIDER - CARE PROVIDER_API CALL
Raymon Monae)  Orthopaedic Surgery  [address]  [place], [place] [address]  Phone: [phone]  Fax: [phone]  Follow Up Time: Raymon Monae)  Orthopaedic Surgery  [address]  [place], [place] [address]  Phone: [phone]  Fax: [phone]  Follow Up Time: 2 weeks

## 2020-02-14 NOTE — PROGRESS NOTE ADULT - SUBJECTIVE AND OBJECTIVE BOX
S: Patient seen and examined. Doing well this AM. Pain reported but controlled. No acute events overnight.    O:   Vital Signs Last 24 Hrs  T(C): 36.7 (14 Feb 2020 00:42), Max: 36.7 (13 Feb 2020 09:02)  T(F): 98.1 (14 Feb 2020 00:42), Max: 98.1 (14 Feb 2020 00:42)  HR: 67 (14 Feb 2020 00:42) (48 - 79)  BP: 124/69 (14 Feb 2020 00:42) (110/71 - 134/61)  BP(mean): 93 (13 Feb 2020 16:38) (81 - 93)  RR: 16 (14 Feb 2020 00:42) (11 - 16)  SpO2: 95% (14 Feb 2020 00:42) (95% - 100%)    Motor: intact GS/TA/EHL/FHL BL   SILT to patients baseline BL  WWP DP PT BL  Dressing C/D/I

## 2020-02-14 NOTE — CONSULT NOTE ADULT - SUBJECTIVE AND OBJECTIVE BOX
LATE ENTRY - pt seen approx 11:30am  49M with DM (a1c 6.7), HLD, OA here for L LAKESHA 2/13 now POD1    Regarding DM - takes metformin, jardiance at home. a1C 6.7. On Atorvastatin for HLD.   Currently states pain is controlled. Denies any numbness or weakness. No chest pain, dyspnea, nausea, abd pain. Urinating wo issue. No BM    FHx: HTN in grandmother  SH: former smoker - quit 1mo ago, EtOH last drink was months ago    PAST MEDICAL & SURGICAL HISTORY:  Osteoarthritis of left hip  Type 2 diabetes mellitus  Morbid obesity  Dyslipidemia  Lipoma of back: removed    Home Meds: Home Medications:  atorvastatin 10 mg oral tablet: 1 tab(s) orally once a day (at bedtime) (13 Feb 2020 08:58)  Jardiance 10 mg oral tablet: 1 tab(s) orally once a day (in the morning) (13 Feb 2020 08:58)  metFORMIN 1000 mg oral tablet: 1 tab(s) orally 2 times a day (13 Feb 2020 08:58)    Allergies: Allergies    No Known Allergies    Intolerances      Soc:   Advanced Directives: Presumed Full Code     CURRENT MEDICATIONS:   --------------------------------------------------------------------------------------  Neurologic Medications  acetaminophen   Tablet .. 650 milliGRAM(s) Oral every 6 hours  celecoxib 200 milliGRAM(s) Oral two times a day  diphenhydrAMINE 25 milliGRAM(s) Oral at bedtime PRN Insomnia  HYDROmorphone  Injectable 0.5 milliGRAM(s) IV Push every 15 minutes PRN Severe Pain (7 - 10)  ondansetron Injectable 4 milliGRAM(s) IV Push every 6 hours PRN Nausea and/or Vomiting  oxyCODONE    IR 5 milliGRAM(s) Oral every 4 hours PRN Mild Pain (1 - 3)  oxyCODONE    IR 10 milliGRAM(s) Oral every 4 hours PRN Moderate Pain (4 - 6)    Respiratory Medications    Cardiovascular Medications    Gastrointestinal Medications  aluminum hydroxide/magnesium hydroxide/simethicone Suspension 30 milliLiter(s) Oral four times a day PRN Indigestion  dextrose 5%. 1000 milliLiter(s) IV Continuous <Continuous>  famotidine    Tablet 20 milliGRAM(s) Oral every 12 hours  lactated ringers. 1000 milliLiter(s) IV Continuous <Continuous>  magnesium hydroxide Suspension 30 milliLiter(s) Oral daily PRN Constipation  pantoprazole    Tablet 40 milliGRAM(s) Oral before breakfast  polyethylene glycol 3350 17 Gram(s) Oral daily  senna 2 Tablet(s) Oral at bedtime PRN Constipation    Genitourinary Medications    Hematologic/Oncologic Medications  aspirin enteric coated 81 milliGRAM(s) Oral two times a day    Antimicrobial/Immunologic Medications    Endocrine/Metabolic Medications  atorvastatin 10 milliGRAM(s) Oral at bedtime  dextrose 40% Gel 15 Gram(s) Oral once PRN Blood Glucose LESS THAN 70 milliGRAM(s)/deciliter  dextrose 50% Injectable 12.5 Gram(s) IV Push once  dextrose 50% Injectable 25 Gram(s) IV Push once  dextrose 50% Injectable 25 Gram(s) IV Push once  glucagon  Injectable 1 milliGRAM(s) IntraMuscular once PRN Glucose LESS THAN 70 milligrams/deciliter  insulin lispro (HumaLOG) corrective regimen sliding scale   SubCutaneous Before meals and at bedtime    Topical/Other Medications    --------------------------------------------------------------------------------------    VITAL SIGNS, INS/OUTS (last 24 hours):  --------------------------------------------------------------------------------------  ICU Vital Signs Last 24 Hrs  T(C): 36.4 (14 Feb 2020 20:20), Max: 36.8 (14 Feb 2020 08:29)  T(F): 97.5 (14 Feb 2020 20:20), Max: 98.3 (14 Feb 2020 08:29)  HR: 80 (14 Feb 2020 20:20) (59 - 80)  BP: 134/67 (14 Feb 2020 20:20) (102/61 - 134/67)  BP(mean): --  ABP: --  ABP(mean): --  RR: 16 (14 Feb 2020 20:20) (16 - 17)  SpO2: 95% (14 Feb 2020 20:20) (95% - 97%)    I&O's Summary    13 Feb 2020 07:01  -  14 Feb 2020 07:00  --------------------------------------------------------  IN: 360 mL / OUT: 1000 mL / NET: -640 mL    14 Feb 2020 07:01  -  14 Feb 2020 20:21  --------------------------------------------------------  IN: 480 mL / OUT: 400 mL / NET: 80 mL      --------------------------------------------------------------------------------------    EXAM:  GEN: Male in bed in NAD  HEENT: NC/AT, MMM  NECK: Supple  CV: RRR, nml s1s2, no murmurs, warm ext w SCDs, no tenderness  PULM: Nml effort, CTAB, no rales, wheezing, ronchi  ABD: soft, non-tender, nABS  NEURO: Alert, conversant  CN II-XII grossly intact  Sensory intact b/l  Motor: LLE 3/5, BUE 5/5, RLE 5/5  Cerebellar: gait deferred    LABS  --------------------------------------------------------------------------------------  Labs:  CAPILLARY BLOOD GLUCOSE      POCT Blood Glucose.: 150 mg/dL (14 Feb 2020 16:37)  POCT Blood Glucose.: 171 mg/dL (14 Feb 2020 06:46)  POCT Blood Glucose.: 167 mg/dL (13 Feb 2020 21:49)                          13.4   14.66 )-----------( 203      ( 14 Feb 2020 06:54 )             40.2         02-14    133<L>  |  104  |  14  ----------------------------<  192<H>  3.7   |  21<L>  |  0.74      Calcium, Total Serum: 8.2 mg/dL (02-14-20 @ 06:54)      LFTs:         Coags:                  --------------------------------------------------------------------------------------    OTHER LABS    IMAGING RESULTS  ****************

## 2020-02-15 ENCOUNTER — TRANSCRIPTION ENCOUNTER (OUTPATIENT)
Age: 50
End: 2020-02-15

## 2020-02-15 VITALS — OXYGEN SATURATION: 97 % | HEART RATE: 74 BPM | DIASTOLIC BLOOD PRESSURE: 73 MMHG | SYSTOLIC BLOOD PRESSURE: 127 MMHG

## 2020-02-15 LAB
ANION GAP SERPL CALC-SCNC: 7 MMOL/L — SIGNIFICANT CHANGE UP (ref 5–17)
BUN SERPL-MCNC: 12 MG/DL — SIGNIFICANT CHANGE UP (ref 7–23)
CALCIUM SERPL-MCNC: 8 MG/DL — LOW (ref 8.4–10.5)
CHLORIDE SERPL-SCNC: 103 MMOL/L — SIGNIFICANT CHANGE UP (ref 96–108)
CO2 SERPL-SCNC: 23 MMOL/L — SIGNIFICANT CHANGE UP (ref 22–31)
CREAT SERPL-MCNC: 0.71 MG/DL — SIGNIFICANT CHANGE UP (ref 0.5–1.3)
GLUCOSE BLDC GLUCOMTR-MCNC: 125 MG/DL — HIGH (ref 70–99)
GLUCOSE BLDC GLUCOMTR-MCNC: 139 MG/DL — HIGH (ref 70–99)
GLUCOSE SERPL-MCNC: 151 MG/DL — HIGH (ref 70–99)
HCT VFR BLD CALC: 38.8 % — LOW (ref 39–50)
HGB BLD-MCNC: 12.5 G/DL — LOW (ref 13–17)
MCHC RBC-ENTMCNC: 30.3 PG — SIGNIFICANT CHANGE UP (ref 27–34)
MCHC RBC-ENTMCNC: 32.2 GM/DL — SIGNIFICANT CHANGE UP (ref 32–36)
MCV RBC AUTO: 94.2 FL — SIGNIFICANT CHANGE UP (ref 80–100)
NRBC # BLD: 0 /100 WBCS — SIGNIFICANT CHANGE UP (ref 0–0)
PLATELET # BLD AUTO: 177 K/UL — SIGNIFICANT CHANGE UP (ref 150–400)
POTASSIUM SERPL-MCNC: 3.8 MMOL/L — SIGNIFICANT CHANGE UP (ref 3.5–5.3)
POTASSIUM SERPL-SCNC: 3.8 MMOL/L — SIGNIFICANT CHANGE UP (ref 3.5–5.3)
RBC # BLD: 4.12 M/UL — LOW (ref 4.2–5.8)
RBC # FLD: 13.7 % — SIGNIFICANT CHANGE UP (ref 10.3–14.5)
SODIUM SERPL-SCNC: 133 MMOL/L — LOW (ref 135–145)
WBC # BLD: 10.9 K/UL — HIGH (ref 3.8–10.5)
WBC # FLD AUTO: 10.9 K/UL — HIGH (ref 3.8–10.5)

## 2020-02-15 PROCEDURE — 99233 SBSQ HOSP IP/OBS HIGH 50: CPT

## 2020-02-15 RX ORDER — ASPIRIN/CALCIUM CARB/MAGNESIUM 324 MG
1 TABLET ORAL
Qty: 60 | Refills: 0
Start: 2020-02-15 | End: 2020-03-15

## 2020-02-15 RX ORDER — CELECOXIB 200 MG/1
1 CAPSULE ORAL
Qty: 28 | Refills: 0
Start: 2020-02-15 | End: 2020-02-28

## 2020-02-15 RX ORDER — ACETAMINOPHEN 500 MG
2 TABLET ORAL
Qty: 0 | Refills: 0 | DISCHARGE
Start: 2020-02-15

## 2020-02-15 RX ORDER — OXYCODONE HYDROCHLORIDE 5 MG/1
1 TABLET ORAL
Qty: 42 | Refills: 0
Start: 2020-02-15 | End: 2020-02-21

## 2020-02-15 RX ORDER — DOCUSATE SODIUM 100 MG
1 CAPSULE ORAL
Qty: 0 | Refills: 0 | DISCHARGE

## 2020-02-15 RX ORDER — SENNA PLUS 8.6 MG/1
2 TABLET ORAL
Qty: 0 | Refills: 0 | DISCHARGE
Start: 2020-02-15

## 2020-02-15 RX ADMIN — PANTOPRAZOLE SODIUM 40 MILLIGRAM(S): 20 TABLET, DELAYED RELEASE ORAL at 05:14

## 2020-02-15 RX ADMIN — Medication 81 MILLIGRAM(S): at 05:14

## 2020-02-15 RX ADMIN — MAGNESIUM HYDROXIDE 30 MILLILITER(S): 400 TABLET, CHEWABLE ORAL at 06:53

## 2020-02-15 RX ADMIN — Medication 650 MILLIGRAM(S): at 06:14

## 2020-02-15 RX ADMIN — POLYETHYLENE GLYCOL 3350 17 GRAM(S): 17 POWDER, FOR SOLUTION ORAL at 11:47

## 2020-02-15 RX ADMIN — Medication 15 MILLIGRAM(S): at 07:56

## 2020-02-15 RX ADMIN — FAMOTIDINE 20 MILLIGRAM(S): 10 INJECTION INTRAVENOUS at 05:14

## 2020-02-15 RX ADMIN — Medication 650 MILLIGRAM(S): at 12:30

## 2020-02-15 RX ADMIN — Medication 650 MILLIGRAM(S): at 11:47

## 2020-02-15 RX ADMIN — Medication 650 MILLIGRAM(S): at 05:14

## 2020-02-15 RX ADMIN — CELECOXIB 200 MILLIGRAM(S): 200 CAPSULE ORAL at 06:14

## 2020-02-15 RX ADMIN — CELECOXIB 200 MILLIGRAM(S): 200 CAPSULE ORAL at 05:14

## 2020-02-15 RX ADMIN — Medication 650 MILLIGRAM(S): at 00:18

## 2020-02-15 NOTE — DISCHARGE NOTE NURSING/CASE MANAGEMENT/SOCIAL WORK - PATIENT PORTAL LINK FT
You can access the FollowMyHealth Patient Portal offered by [facility] by registering at the following website: [email or web address]. By joining [facility]’s FollowMyHealth portal, you will also be able to view your health information using other applications (apps) compatible with our system.

## 2020-02-15 NOTE — PROGRESS NOTE ADULT - SUBJECTIVE AND OBJECTIVE BOX
O/N Events: JACI  Subjective/ROS: Denies HA, CP, SOB, n/v, changes in bowel/urinary habits.  12pt ROS otherwise negative.    VITALS  Vital Signs Last 24 Hrs  T(C): 37.1 (15 Feb 2020 08:47), Max: 37.1 (15 Feb 2020 08:47)  T(F): 98.7 (15 Feb 2020 08:47), Max: 98.7 (15 Feb 2020 08:47)  HR: 74 (15 Feb 2020 11:14) (72 - 83)  BP: 127/73 (15 Feb 2020 11:14) (126/68 - 134/67)  BP(mean): --  RR: 16 (15 Feb 2020 08:47) (16 - 16)  SpO2: 97% (15 Feb 2020 11:14) (95% - 97%)    CAPILLARY BLOOD GLUCOSE      POCT Blood Glucose.: 125 mg/dL (15 Feb 2020 12:33)  POCT Blood Glucose.: 139 mg/dL (15 Feb 2020 06:48)  POCT Blood Glucose.: 210 mg/dL (14 Feb 2020 21:47)  POCT Blood Glucose.: 150 mg/dL (14 Feb 2020 16:37)      PHYSICAL EXAM  General: A&Ox3; NAD; walking w/ walker in hallway  Head: NC/AT; MMM; PERRL; EOMI;  Neck: Supple; no JVD  Respiratory: CTA B/L; no wheezes/crackles   Cardiovascular: Regular rhythm/rate; S1/S2   Gastrointestinal: Soft; NTND; normoactive BS  Extremities: WWP; no edema/cyanosis  Neurological:  CNII-XII grossly intact; no obvious focal deficits    MEDICATIONS  (STANDING):  acetaminophen   Tablet .. 650 milliGRAM(s) Oral every 6 hours  aspirin enteric coated 81 milliGRAM(s) Oral two times a day  atorvastatin 10 milliGRAM(s) Oral at bedtime  celecoxib 200 milliGRAM(s) Oral two times a day  dextrose 5%. 1000 milliLiter(s) (50 mL/Hr) IV Continuous <Continuous>  dextrose 50% Injectable 12.5 Gram(s) IV Push once  dextrose 50% Injectable 25 Gram(s) IV Push once  dextrose 50% Injectable 25 Gram(s) IV Push once  famotidine    Tablet 20 milliGRAM(s) Oral every 12 hours  insulin lispro (HumaLOG) corrective regimen sliding scale   SubCutaneous Before meals and at bedtime  lactated ringers. 1000 milliLiter(s) (120 mL/Hr) IV Continuous <Continuous>  pantoprazole    Tablet 40 milliGRAM(s) Oral before breakfast  polyethylene glycol 3350 17 Gram(s) Oral daily    MEDICATIONS  (PRN):  aluminum hydroxide/magnesium hydroxide/simethicone Suspension 30 milliLiter(s) Oral four times a day PRN Indigestion  bisacodyl Suppository 10 milliGRAM(s) Rectal daily PRN If no bowel movement by POD#2  dextrose 40% Gel 15 Gram(s) Oral once PRN Blood Glucose LESS THAN 70 milliGRAM(s)/deciliter  diphenhydrAMINE 25 milliGRAM(s) Oral at bedtime PRN Insomnia  glucagon  Injectable 1 milliGRAM(s) IntraMuscular once PRN Glucose LESS THAN 70 milligrams/deciliter  HYDROmorphone  Injectable 0.5 milliGRAM(s) IV Push every 15 minutes PRN Severe Pain (7 - 10)  magnesium hydroxide Suspension 30 milliLiter(s) Oral daily PRN Constipation  ondansetron Injectable 4 milliGRAM(s) IV Push every 6 hours PRN Nausea and/or Vomiting  oxyCODONE    IR 5 milliGRAM(s) Oral every 4 hours PRN Mild Pain (1 - 3)  oxyCODONE    IR 10 milliGRAM(s) Oral every 4 hours PRN Moderate Pain (4 - 6)  senna 2 Tablet(s) Oral at bedtime PRN Constipation      No Known Allergies      LABS                        12.5   10.90 )-----------( 177      ( 15 Feb 2020 07:29 )             38.8     02-15    133<L>  |  103  |  12  ----------------------------<  151<H>  3.8   |  23  |  0.71    Ca    8.0<L>      15 Feb 2020 07:29    IMAGING/EKG/ETC: reviewed

## 2020-02-15 NOTE — PROGRESS NOTE ADULT - ASSESSMENT
49M w/ DM (a1c 6.7), HLD, OA here for L LAKESHA 2/13 now POD2    #Post-op state - pain controlled at this time. c/w bowel regimen, c/w IS.  #OA - Stable  #DM - stable. FSG at target. c/w SSI  #HLD - c/w home regimen    #Hyponatremia - 133 today, appears euvolemic. asymptomatic, c/w monitoring while inpatient    DVT PPx - C/w asa

## 2020-02-15 NOTE — PROGRESS NOTE ADULT - SUBJECTIVE AND OBJECTIVE BOX
S: No events overnight    O:   Vital Signs Last 24 Hrs  T(C): 37.1 (15 Feb 2020 08:47), Max: 37.1 (15 Feb 2020 08:47)  T(F): 98.7 (15 Feb 2020 08:47), Max: 98.7 (15 Feb 2020 08:47)  HR: 72 (15 Feb 2020 08:47) (66 - 83)  BP: 130/60 (15 Feb 2020 08:47) (126/68 - 134/67)  BP(mean): --  RR: 16 (15 Feb 2020 08:47) (16 - 16)  SpO2: 97% (15 Feb 2020 08:47) (95% - 97%)    02-14 @ 07:01  -  02-15 @ 07:00  --------------------------------------------------------  IN:    Oral Fluid: 480 mL  Total IN: 480 mL    OUT:    Voided: 400 mL  Total OUT: 400 mL    Total NET: 80 mL    PE:  LLE DSG CDI  Sensation to light touch intact S/S/DP/SP/Tib, Strength EHL/FHL/TA/GS 5/5, DP 2+, Ext WWP                        12.5   10.90 )-----------( 177      ( 15 Feb 2020 07:29 )             38.8     02-15    133<L>  |  103  |  12  ----------------------------<  151<H>  3.8   |  23  |  0.71    Ca    8.0<L>      15 Feb 2020 07:29

## 2020-02-15 NOTE — PROGRESS NOTE ADULT - ASSESSMENT
49y m s/p L LAKESHA 2/13 POD2    -DM, med recs   -PT PWB50%  -Pain control  -DVT PPX: ASA  -Dispo Planning: Home, PT

## 2020-02-20 DIAGNOSIS — E66.01 MORBID (SEVERE) OBESITY DUE TO EXCESS CALORIES: ICD-10-CM

## 2020-02-20 DIAGNOSIS — E11.9 TYPE 2 DIABETES MELLITUS WITHOUT COMPLICATIONS: ICD-10-CM

## 2020-02-20 DIAGNOSIS — M25.552 PAIN IN LEFT HIP: ICD-10-CM

## 2020-02-20 DIAGNOSIS — E87.1 HYPO-OSMOLALITY AND HYPONATREMIA: ICD-10-CM

## 2020-02-20 DIAGNOSIS — M16.12 UNILATERAL PRIMARY OSTEOARTHRITIS, LEFT HIP: ICD-10-CM

## 2020-02-20 DIAGNOSIS — E78.5 HYPERLIPIDEMIA, UNSPECIFIED: ICD-10-CM

## 2020-02-20 DIAGNOSIS — Z79.84 LONG TERM (CURRENT) USE OF ORAL HYPOGLYCEMIC DRUGS: ICD-10-CM

## 2020-02-20 PROCEDURE — 86901 BLOOD TYPING SEROLOGIC RH(D): CPT

## 2020-02-20 PROCEDURE — 36415 COLL VENOUS BLD VENIPUNCTURE: CPT

## 2020-02-20 PROCEDURE — 86850 RBC ANTIBODY SCREEN: CPT

## 2020-02-20 PROCEDURE — 97116 GAIT TRAINING THERAPY: CPT

## 2020-02-20 PROCEDURE — 85027 COMPLETE CBC AUTOMATED: CPT

## 2020-02-20 PROCEDURE — 80048 BASIC METABOLIC PNL TOTAL CA: CPT

## 2020-02-20 PROCEDURE — 82962 GLUCOSE BLOOD TEST: CPT

## 2020-02-20 PROCEDURE — C1776: CPT

## 2020-02-20 PROCEDURE — 73501 X-RAY EXAM HIP UNI 1 VIEW: CPT

## 2020-02-20 PROCEDURE — 85025 COMPLETE CBC W/AUTO DIFF WBC: CPT

## 2020-02-20 PROCEDURE — 83036 HEMOGLOBIN GLYCOSYLATED A1C: CPT

## 2020-02-20 PROCEDURE — 97161 PT EVAL LOW COMPLEX 20 MIN: CPT

## 2020-02-28 ENCOUNTER — APPOINTMENT (OUTPATIENT)
Dept: ORTHOPEDIC SURGERY | Facility: CLINIC | Age: 50
End: 2020-02-28
Payer: COMMERCIAL

## 2020-02-28 DIAGNOSIS — Z47.1 AFTERCARE FOLLOWING JOINT REPLACEMENT SURGERY: ICD-10-CM

## 2020-02-28 DIAGNOSIS — Z96.642 AFTERCARE FOLLOWING JOINT REPLACEMENT SURGERY: ICD-10-CM

## 2020-02-28 PROCEDURE — 99024 POSTOP FOLLOW-UP VISIT: CPT

## 2020-02-28 RX ORDER — CELECOXIB 100 MG/1
100 CAPSULE ORAL TWICE DAILY
Qty: 60 | Refills: 0 | Status: ACTIVE | COMMUNITY
Start: 2020-02-28 | End: 1900-01-01

## 2020-02-28 RX ORDER — ASPIRIN 81 MG/1
81 TABLET, CHEWABLE ORAL DAILY
Qty: 30 | Refills: 0 | Status: ACTIVE | COMMUNITY
Start: 2020-02-28 | End: 1900-01-01

## 2020-03-23 NOTE — HISTORY OF PRESENT ILLNESS
[de-identified] : Mukul is a 49 year old male s/p left THR on 02/13/20 here for 1st post operative visit. Patient is overall doing great and offers no significant complaints. He is able to ambulate and do most daily living activities. He takes Celebrex, Aspiring, or Oxycodone if needed for pain management.

## 2020-03-23 NOTE — END OF VISIT
[FreeTextEntry3] : By signing my name below, I, Catie Hernadez, attest that this documentation has been prepared under the direction and in the presence of Mike Aguayo PA-C.\par

## 2020-03-23 NOTE — PHYSICAL EXAM
[de-identified] : General: Not in acute distress, dressed appropriately, sitting on examination table\par Skin: Warm and dry, normal turgor, no rashes\par Neurological: AOx3, Cranial nerves grossly in tact\par Psych: Mood and affect appropriate\par \par Left Hip: Well healed surgical incision. No swelling edema erythema redness or drainage. Nontender.. ROM: Not assessed. 5/5 strength. Normal gait, ambulates with a cane. \par  \par  [de-identified] : No imaging today.\par

## 2020-03-23 NOTE — ASSESSMENT
[FreeTextEntry1] : Assessment/Plan\par \par Status post left total hip, incision is healing well. He will continue weightbearing and hip precautions until 5 weeks, weightbearing as tolerated after that and restrictions lifted, begin physical therapy at 5 weeks - prescription given, \par follow up in 10 week anniversary with x-rays, all questions answered.\par \par F/U in 10 weeks for x-rays \par \par

## 2020-04-06 ENCOUNTER — APPOINTMENT (OUTPATIENT)
Dept: ORTHOPEDIC SURGERY | Facility: CLINIC | Age: 50
End: 2020-04-06

## 2020-05-13 ENCOUNTER — FORM ENCOUNTER (OUTPATIENT)
Age: 50
End: 2020-05-13

## 2022-07-20 NOTE — PHYSICAL THERAPY INITIAL EVALUATION ADULT - ASR WT BEARING STATUS EVAL
Problem: Patient Centered Care  Goal: Patient preferences are identified and integrated in the patient's plan of care  Description: Interventions:  - What would you like us to know as we care for you? 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  - Provide timely, complete, and accurate information to patient/family  - Incorporate patient and family knowledge, values, beliefs, and cultural backgrounds into the planning and delivery of care  - Encourage patient/family to participate in care and decision-making at the level they choose  - Honor patient and family perspectives and choices  Outcome: Progressing     Problem: Patient/Family Goals  Goal: Patient/Family Long Term Goal  Description: Patient's Long Term Goal: Feel better    Interventions:  - IV abx  - Replace electrolytes  - IVF  - UA  - GI stool panel  - Strict intake and ouput  - See additional Care Plan goals for specific interventions  Outcome: Progressing  Goal: Patient/Family Short Term Goal  Description: Patient's Short Term Goal: No pain    Interventions:   - Pain meds prn  - US kidney  - CT pelvis  - Monitor vitals  - Reposition  - See additional Care Plan goals for specific interventions  Outcome: Progressing     Problem: CARDIOVASCULAR - ADULT  Goal: Maintains optimal cardiac output and hemodynamic stability  Description: INTERVENTIONS:  - Monitor vital signs, rhythm, and trends  - Monitor for bleeding, hypotension and signs of decreased cardiac output  - Evaluate effectiveness of vasoactive medications to optimize hemodynamic stability  - Monitor arterial and/or venous puncture sites for bleeding and/or hematoma  - Assess quality of pulses, skin color and temperature  - Assess for signs of decreased coronary artery perfusion - ex.  Angina  - Evaluate fluid balance, assess for edema, trend weights  Outcome: Progressing  Goal: Absence of cardiac arrhythmias or at baseline  Description: INTERVENTIONS:  - Continuous cardiac monitoring, monitor vital signs, obtain 12 lead EKG if indicated  - Evaluate effectiveness of antiarrhythmic and heart rate control medications as ordered  - Initiate emergency measures for life threatening arrhythmias  - Monitor electrolytes and administer replacement therapy as ordered  Outcome: Progressing     Problem: PAIN - ADULT  Goal: Verbalizes/displays adequate comfort level or patient's stated pain goal  Description: INTERVENTIONS:  - Encourage pt to monitor pain and request assistance  - Assess pain using appropriate pain scale  - Administer analgesics based on type and severity of pain and evaluate response  - Implement non-pharmacological measures as appropriate and evaluate response  - Consider cultural and social influences on pain and pain management  - Manage/alleviate anxiety  - Utilize distraction and/or relaxation techniques  - Monitor for opioid side effects  - Notify MD/LIP if interventions unsuccessful or patient reports new pain  - Anticipate increased pain with activity and pre-medicate as appropriate  Outcome: Progressing     Problem: RISK FOR INFECTION - ADULT  Goal: Absence of fever/infection during anticipated neutropenic period  Description: INTERVENTIONS  - Monitor WBC  - Administer growth factors as ordered  - Implement neutropenic guidelines  Outcome: Progressing     Problem: GASTROINTESTINAL - ADULT  Goal: Minimal or absence of nausea and vomiting  Description: INTERVENTIONS:  - Maintain adequate hydration with IV or PO as ordered and tolerated  - Nasogastric tube to low intermittent suction as ordered  - Evaluate effectiveness of ordered antiemetic medications  - Provide nonpharmacologic comfort measures as appropriate  - Advance diet as tolerated, if ordered  - Obtain nutritional consult as needed  - Evaluate fluid balance  Outcome: Progressing  Goal: Maintains or returns to baseline bowel function  Description: INTERVENTIONS:  - Assess bowel function  - Maintain adequate hydration with IV or PO as ordered and tolerated  - Evaluate effectiveness of GI medications  - Encourage mobilization and activity  - Obtain nutritional consult as needed  - Establish a toileting routine/schedule  - Consider collaborating with pharmacy to review patient's medication profile  Outcome: Progressing   IVF. IV abx. K replaced. Stool and UA collected. Bladder scanned patient - see flowsheet. CT pelvis, US kidney, Echo, and chest xray today. Blood cultures. Upper respiratory panel negative. Left LE

## 2022-10-11 NOTE — BRIEF OPERATIVE NOTE - NSICDXBRIEFPREOP_GEN_ALL_CORE_FT
PRE-OP DIAGNOSIS:  Hip arthritis 13-Feb-2020 12:14:40  Avelino Sorto
Alert-The patient is alert, awake and responds to voice. The patient is oriented to time, place, and person. The triage nurse is able to obtain subjective information.

## 2023-10-30 NOTE — PROGRESS NOTE ADULT - ASSESSMENT
A/P: y s/p L LAKESHA  -stable  -ASA 81mg BID  -pain control  -PT/PWB  -diet as tolerated  -f/u labs  -disposition: TBD  -discussed with attending Adjacent Tissue Transfer Text: The defect edges were debeveled with a #15 scalpel blade. Given the location of the defect and the proximity to free margins an adjacent tissue transfer was deemed most appropriate. Using a sterile surgical marker, an appropriate flap was drawn incorporating the defect and placing the expected incisions within the relaxed skin tension lines where possible. The area thus outlined was incised deep to adipose tissue with a #15 scalpel blade. The skin margins were undermined to an appropriate distance in all directions utilizing iris scissors and carried over to close the primary defect.